# Patient Record
Sex: FEMALE | Race: WHITE | URBAN - METROPOLITAN AREA
[De-identification: names, ages, dates, MRNs, and addresses within clinical notes are randomized per-mention and may not be internally consistent; named-entity substitution may affect disease eponyms.]

---

## 2019-03-06 ENCOUNTER — TRANSFERRED RECORDS (OUTPATIENT)
Dept: HEALTH INFORMATION MANAGEMENT | Facility: CLINIC | Age: 24
End: 2019-03-06

## 2019-03-18 DIAGNOSIS — H35.50 RETINAL DYSTROPHY: Primary | ICD-10-CM

## 2019-03-19 ENCOUNTER — TELEPHONE (OUTPATIENT)
Dept: OPHTHALMOLOGY | Facility: CLINIC | Age: 24
End: 2019-03-19

## 2019-03-19 ENCOUNTER — OFFICE VISIT (OUTPATIENT)
Dept: OPHTHALMOLOGY | Facility: CLINIC | Age: 24
End: 2019-03-19
Attending: OPHTHALMOLOGY
Payer: COMMERCIAL

## 2019-03-19 DIAGNOSIS — H35.89 ATROPHIC RETINA: Primary | ICD-10-CM

## 2019-03-19 PROCEDURE — 92250 FUNDUS PHOTOGRAPHY W/I&R: CPT | Mod: ZF | Performed by: OPHTHALMOLOGY

## 2019-03-19 PROCEDURE — 92134 CPTRZ OPH DX IMG PST SGM RTA: CPT | Mod: ZF | Performed by: OPHTHALMOLOGY

## 2019-03-19 PROCEDURE — G0463 HOSPITAL OUTPT CLINIC VISIT: HCPCS | Mod: ZF

## 2019-03-19 RX ORDER — LEVONORGESTREL/ETHIN.ESTRADIOL 0.1-0.02MG
TABLET ORAL
COMMUNITY

## 2019-03-19 ASSESSMENT — REFRACTION_WEARINGRX
OS_CYLINDER: +1.75
OS_SPHERE: -6.25
SPECS_TYPE: SVL
OD_SPHERE: -3.50
OS_AXIS: 100
OD_CYLINDER: +1.75
OD_AXIS: 078

## 2019-03-19 ASSESSMENT — SLIT LAMP EXAM - LIDS
COMMENTS: NORMAL
COMMENTS: NORMAL

## 2019-03-19 ASSESSMENT — VISUAL ACUITY
OS_CC: 20/20
METHOD: SNELLEN - LINEAR
OD_CC: 20/20
CORRECTION_TYPE: GLASSES

## 2019-03-19 ASSESSMENT — CONF VISUAL FIELD
OS_NORMAL: 1
OD_NORMAL: 1

## 2019-03-19 ASSESSMENT — EXTERNAL EXAM - LEFT EYE: OS_EXAM: NORMAL

## 2019-03-19 ASSESSMENT — CUP TO DISC RATIO
OD_RATIO: 0.3
OS_RATIO: 0.3

## 2019-03-19 ASSESSMENT — TONOMETRY
IOP_METHOD: ICARE
OS_IOP_MMHG: 13
OD_IOP_MMHG: 13

## 2019-03-19 ASSESSMENT — EXTERNAL EXAM - RIGHT EYE: OD_EXAM: NORMAL

## 2019-03-19 NOTE — NURSING NOTE
Chief Complaints and History of Present Illnesses   Patient presents with     Retinal Evaluation     Chief Complaint(s) and History of Present Illness(es)     Retinal Evaluation     Laterality: both eyes    Onset: 1 year ago              Comments     Pt. States that she went in for routine exam and was told to see a retina specialist.  No change in VA BE.  Occasional flashes and floaters BE.  Mother has VMT.  Nicol Wilson COT 8:17 AM March 19, 2019

## 2019-03-19 NOTE — LETTER
3/19/2019       RE: Addie Perez  [address] [address]     Dear Colleague,    Thank you for referring your patient, Addie Perez, to the EYE CLINIC at [facility]. Please see a copy of my visit note below.    CC -  Peripheral retinal abnormalities    INTERVAL HISTORY - Initial visit with me.  No changes since saw [facility]    HPI -   Addie Perez is a  23 year old year-old patient referred by Dr Whiteside from [facility] for evaluation and treat of a possible retinal dystrophy OU.  Seen by Queta Damian for routine eye exam 2/2019 found peripheral changes, referred to [facility], sent to [facility] for eval  Has had annual eye exams, no prior mention.  Near-full-term birth, normal BW, no respiratory disease.  No h/o trauma  No h/o photopsias, no trouble in dim light, no trouble in bright light, no nyctalopia, mild difficulty with orange/yellow color discrimination but feels overall color vision normal  No family h/o retinal dystrophy      PAST OCULAR SURGERY  None    RETINAL IMAGING:  OCT 3-19-19  OD - macula retina normal, PHF attached  OS - macula retina normal, PHF attached    AF 3-19-19  OD - peripheral hypoAF with mild hyperAF border  OS - peripheral hypoAF with hyperAF border            ASSESSMENT & PLAN    1.  Peripheral retinal atrophy OU   - possible dystrophy   - DDx prior shallow RD (less likely d/t 360), developmental anomaly   - check GVF & ffERG      - will need to monitor for progression likely    2. Vitreous syneresis OU    return to clinic: for testing    ATTESTATION   Attending Attestation:  Complete documentation of historical and exam elements from today's encounter can be found in the full encounter summary report (not reduplicated in this progress note).  I personally obtained the chief complaint(s) and history of present illness.  I confirmed and edited as necessary the review of systems, past medical/surgical history, family history, social history, and  examination findings as documented by others; and I examined the patient myself.  I personally reviewed the relevant tests, images, and reports as documented above.  I formulated and edited as necessary the assessment and plan and discussed the findings and management plan with the patient and family. Светлана Gracia MD, PhD      Base Eye Exam     Visual Acuity (Snellen - Linear)       Right Left    Dist cc 20/20 20/20    Correction:  Glasses          Tonometry ([facility], 8:20 AM)       Right Left    Pressure 13 13          Pupils       Pupils React APD    Right PERRL Brisk None    Left PERRL Brisk None          Visual Fields       Left Right     Full Full          Extraocular Movement       Right Left     Full Full          Neuro/Psych     Oriented x3:  Yes    Mood/Affect:  Normal          Dilation     Both eyes:  1.0% Mydriacyl, 2.5% Dale Synephrine @ 8:22 AM            Slit Lamp and Fundus Exam     External Exam       Right Left    External Normal Normal          Slit Lamp Exam       Right Left    Lids/Lashes Normal Normal    Conjunctiva/Sclera White and quiet White and quiet    Cornea Clear Clear    Anterior Chamber Deep and quiet Deep and quiet    Iris Dilated Dilated    Lens clear phakic clear phakic    Vitreous AV clear, syneresis AV clear, syneresis          Fundus Exam       Right Left    Disc Normal Normal    C/D Ratio 0.3 0.3    Macula Normal Normal    Vessels Normal Normal    Periphery Far peripheral ring of atrophy 360 degrees with mild pigment clumping Far peripheral ring of atrophy 360 degrees with mild pigment clumping            Refraction     Wearing Rx       Sphere Cylinder Axis    Right -3.50 +1.75 078    Left -6.25 +1.75 100    Age:  2yr    Type:  SVL                Again, thank you for allowing me to participate in the care of your patient.      Sincerely,    Светлана Gracia MD, PhD  , Vitreoretinal Surgery  Department of Ophthalmology & Visual Neurosciences  [facility]  [place]     work

## 2019-03-19 NOTE — PROGRESS NOTES
CC -  Peripheral retinal abnormalities    INTERVAL HISTORY - Initial visit with me.  No changes since saw [facility]    HPI -   Addie Perez is a  23 year old year-old patient referred by Dr Whiteside from [facility] for evaluation and treat of a possible retinal dystrophy OU.  Seen by Queta Damian for routine eye exam 2/2019 found peripheral changes, referred to [facility], sent to [facility] for eval  Has had annual eye exams, no prior mention.  Near-full-term birth, normal BW, no respiratory disease.  No h/o trauma  No h/o photopsias, no trouble in dim light, no trouble in bright light, no nyctalopia, mild difficulty with orange/yellow color discrimination but feels overall color vision normal  No family h/o retinal dystrophy      PAST OCULAR SURGERY  None    RETINAL IMAGING:  OCT 3-19-19  OD - macula retina normal, PHF attached  OS - macula retina normal, PHF attached    AF 3-19-19  OD - peripheral hypoAF with mild hyperAF border  OS - peripheral hypoAF with hyperAF border            ASSESSMENT & PLAN    1.  Peripheral retinal atrophy OU   - possible dystrophy   - DDx prior shallow RD (less likely d/t 360), developmental anomaly   - check GVF & ffERG      - will need to monitor for progression likely      2. Vitreous syneresis OU      return to clinic: for testing    ATTESTATION     Attending Attestation:     Complete documentation of historical and exam elements from today's encounter can be found in the full encounter summary report (not reduplicated in this progress note).  I personally obtained the chief complaint(s) and history of present illness.  I confirmed and edited as necessary the review of systems, past medical/surgical history, family history, social history, and examination findings as documented by others; and I examined the patient myself.  I personally reviewed the relevant tests, images, and reports as documented above.  I formulated and edited as necessary the assessment and plan and discussed the  findings and management plan with the patient and family    Светлана Gracia MD, PhD  , Vitreoretinal Surgery  Department of Ophthalmology  [facility]

## 2019-03-22 ENCOUNTER — HEALTH MAINTENANCE LETTER (OUTPATIENT)
Age: 24
End: 2019-03-22

## 2019-04-08 ENCOUNTER — ALLIED HEALTH/NURSE VISIT (OUTPATIENT)
Dept: OPHTHALMOLOGY | Facility: CLINIC | Age: 24
End: 2019-04-08
Attending: OPHTHALMOLOGY
Payer: COMMERCIAL

## 2019-04-08 DIAGNOSIS — H35.89 ATROPHIC RETINA: ICD-10-CM

## 2019-04-08 PROCEDURE — 40000269 ZZH STATISTIC NO CHARGE FACILITY FEE: Mod: ZF

## 2019-04-08 PROCEDURE — 92274 MULTIFOCAL ERG W/I&R: CPT | Mod: ZF

## 2019-04-08 PROCEDURE — 92083 EXTENDED VISUAL FIELD XM: CPT | Mod: ZF

## 2019-04-08 ASSESSMENT — VISUAL ACUITY
CORRECTION_TYPE: GLASSES
OD_CC: 20/20
OS_CC: 20/20
METHOD: SNELLEN - LINEAR

## 2019-04-08 ASSESSMENT — REFRACTION_WEARINGRX
OS_SPHERE: -6.25
OD_AXIS: 078
OS_AXIS: 100
OS_CYLINDER: +1.75
OD_SPHERE: -3.50
OD_CYLINDER: +1.75
SPECS_TYPE: SVL

## 2019-04-08 NOTE — LETTER
2019       RE: Addie Perez  [address] [address]     Dear Colleague,    Thank you for referring your patient, Addie Perez, to the EYE CLINIC at [facility]. Please see a copy of my visit note below.    2019    STANDARD GVF + ERG REPORT    Referring:  Basil Whiteside    RE:  Addie Perez  MRN:  [id number]  :  1995    ERG Date:  2019    CLINICAL HISTORY:    The patient is a 23 year old woman with peripheral retinal abnormalities in both eyes of unknown etiology, featuring a bilateral peripheral ring of pigment disruption.  Testing was performed to evaluate for retinal dystrophy.    DIAGNOSTIC FINDINGS:    GVF   A GVF was performed in both eyes and was felt to be reliable by the technician.  The right eye results were normal, with a III4e horizontal diameter of 117 degrees.  The left eye showed shallow mid-peripheral scotomas but the III4e horizontal diameter was 120 degrees.    ffERG    A full field ERG was obtained in both eye using DTL electrodes and ISCEV standards.  Per the technician, the recording was essentially reliable in both eyes.      The results for the two eyes were similar. Under scotopic conditions, both eyes were essentially normal.  In particular, the dim white flash amplitudes were normal and the implicit times were normal.  The response to the bright white flash was essentially normal.  In particular, the A-wave amplitudes were super-normal and the implicit times was normal.  The B-wave amplitudes were super-normal and the implicit times were normal.   Oscillatory potentials were normal.     Under photopic conditions, the recordings were essentially normal in each eye.  In particular, the 30 Hz flicker amplitudes were normal and the implicit times were normal.    The single photopic flash morphology was normal in each right eye.  Numerically,  the A-wave amplitudes were normal and the implicit times had no abnormal delay.  The B-wave amplitudes were super-normal and the implicit times were normal.  The 30Hz flicker is more reliable for numerical measurements of the photopic response.                      Visual Acuity Right Eye : 20/20      w/gls, -3.50 + 1.75x078    Visual Acuity Left Eye : 20/20        w/gls, -6.25 + 1.75x100                    ALL AVERAGED  Data for Full-Field ERG Right Eye   Left Eye    Dark-Adapted Patient Normal Patient   0.01 ERG (kalia) amplitude( v)  380* 95.4-392.1 339*   0.01 ERG (kalia) implicit time(ms) 79* 71.7-100.1 84*   MMMMM      3.0 ERG (combined) a-wave amplitude( v) -328 -260.0 to -28.0 -286   3.0 ERG (combined) a-wave implicit time(ms) 15 12.2-21.1 15   3.0 ERG (combined) b-wave amplitude( v) 572 136.5-400.6 474   3.0 ERG (combined) b-wave implicit time(ms) 47.4 29.2-48.8 48.3   MMMMM      3.0 Oscillatory Potentials  + Present +    Light-Adapted      3.0 Flicker (30-Hz) amplitude( v) 152 55.9-187.0 112   3.0 Flicker (30-Hz) implicit time(ms) 25 20.6-28.6 25         3.0 ERG (cone) a-wave amplitude( v) -51 -62.2 to -10.6 -40   3.0 ERG (cone) a-wave implicit time(ms) 13.3 15.0-17.5 14.1   3.0 ERG (cone) b-wave amplitude( v) 228 69.9-205.4 159   3.0 ERG (cone) b-wave implicit time(ms) 27.5 24.6-31.0 27.5         * = manipulated cursors        parentheses = cursors at selected second peaks        ---- = residual to non-measurable        xxxx = not tested       INTERPRETATION:     1.  Normal GVF OD and mildly irregular GVF OS.  These irregularities are subtle and are of unknown significance.  The irregularities are not consistent with the ffERG response.  2. Likely normal ffERG OU. The results are actually super-normal, but this may be a result of the patient's young age being at the limits of the normative database.    3.  The results are not consistent with a retinal dystrophy; the suspicion for dystrophy is quite low now.  4.  The results are better than expected based on the fundus exam, suggesting  that the retinal areas with pigment disruption probably maintain significant function.      RECOMMENDATIONS  I would recommend monitoring the patient with annual photos.  If the atrophic areas expand then repeat GVF and ffERG testing could be performed.    Sincerely,    Светлана Gracia MD, PhD  , Vitreoretinal Surgery  Department of Ophthalmology  [facility]          Base Eye Exam     Visual Acuity (Snellen - Linear)       Right Left    Dist cc 20/20 20/20    Correction:  Glasses          Neuro/Psych     Oriented x3:  Yes    Mood/Affect:  Normal          Dilation     Both eyes:  1.0% Mydriacyl, 2.5% Dale Synephrine @ 11:30 AM   To dilate x2 after GVF done both eyes undilated.  Anxious about eye dx.  States prior vasovagal response to dilation.             Refraction     Wearing Rx       Sphere Cylinder Axis    Right -3.50 +1.75 078    Left -6.25 +1.75 100    Age:  2yr    Type:  SVL

## 2019-04-08 NOTE — PROGRESS NOTES
2019    STANDARD GVF + ERG REPORT    Referring:  Basil Whiteside    RE:  Addie Perez  MRN:  [id number]  :  1995    ERG Date:  2019    CLINICAL HISTORY:    The patient is a 23 year old woman with peripheral retinal abnormalities in both eyes of unknown etiology, featuring a bilateral peripheral ring of pigment disruption.  Testing was performed to evaluate for retinal dystrophy.    DIAGNOSTIC FINDINGS:    GVF   A GVF was performed in both eyes and was felt to be reliable by the technician.  The right eye results were normal, with a III4e horizontal diameter of 117 degrees.  The left eye showed shallow mid-peripheral scotomas but the III4e horizontal diameter was 120 degrees.    ffERG    A full field ERG was obtained in both eye using DTL electrodes and ISCEV standards.  Per the technician, the recording was essentially reliable in both eyes.      The results for the two eyes were similar. Under scotopic conditions, both eyes were essentially normal.  In particular, the dim white flash amplitudes were normal and the implicit times were normal.  The response to the bright white flash was essentially normal.  In particular, the A-wave amplitudes were super-normal and the implicit times was normal.  The B-wave amplitudes were super-normal and the implicit times were normal.   Oscillatory potentials were normal.     Under photopic conditions, the recordings were essentially normal in each eye.  In particular, the 30 Hz flicker amplitudes were normal and the implicit times were normal.    The single photopic flash morphology was normal in each right eye.  Numerically,  the A-wave amplitudes were normal and the implicit times had no abnormal delay. The B-wave amplitudes were super-normal and the implicit times were normal.  The 30Hz flicker is more reliable for numerical measurements of the photopic response.                      Visual Acuity Right Eye : 20/20      w/gls, -3.50 +  1.75x078    Visual Acuity Left Eye : 20/20        w/gls, -6.25 + 1.75x100                    ALL AVERAGED  Data for Full-Field ERG Right Eye   Left Eye    Dark-Adapted Patient Normal Patient   0.01 ERG (kalia) amplitude( v)  380* 95.4-392.1 339*   0.01 ERG (kalia) implicit time(ms) 79* 71.7-100.1 84*   MMMMM      3.0 ERG (combined) a-wave amplitude( v) -328 -260.0 to -28.0 -286   3.0 ERG (combined) a-wave implicit time(ms) 15 12.2-21.1 15   3.0 ERG (combined) b-wave amplitude( v) 572 136.5-400.6 474   3.0 ERG (combined) b-wave implicit time(ms) 47.4 29.2-48.8 48.3   MMMMM      3.0 Oscillatory Potentials  + Present +    Light-Adapted      3.0 Flicker (30-Hz) amplitude( v) 152 55.9-187.0 112   3.0 Flicker (30-Hz) implicit time(ms) 25 20.6-28.6 25         3.0 ERG (cone) a-wave amplitude( v) -51 -62.2 to -10.6 -40   3.0 ERG (cone) a-wave implicit time(ms) 13.3 15.0-17.5 14.1   3.0 ERG (cone) b-wave amplitude( v) 228 69.9-205.4 159   3.0 ERG (cone) b-wave implicit time(ms) 27.5 24.6-31.0 27.5         * = manipulated cursors        parentheses = cursors at selected second peaks        ---- = residual to non-measurable        xxxx = not tested       INTERPRETATION:     1.  Normal GVF OD and mildly irregular GVF OS.  These irregularities are subtle and are of unknown significance.  The irregularities are not consistent with the ffERG response.  2. Likely normal ffERG OU. The results are actually super-normal, but this may be a result of the patient's young age being at the limits of the normative database.    3.  The results are not consistent with a retinal dystrophy; the suspicion for dystrophy is quite low now.  4.  The results are better than expected based on the fundus exam, suggesting that the retinal areas with pigment disruption probably maintain significant function.      RECOMMENDATIONS  I would recommend monitoring the patient with annual photos.  If the atrophic areas expand then repeat GVF and ffERG testing could  be performed.    Sincerely,    Светлана Gracia MD, PhD  , Vitreoretinal Surgery  Department of Ophthalmology  [facility]

## 2019-04-08 NOTE — NURSING NOTE
Returns for GVF+ffERG.  LISA w/Dr. Gracia 03-30-19: +peripheral retinal atrophy, possible retinal dystrophy.  Retinal changes seen on recent routine eye exam  w/Queta Damian (optom) then referred to Dr. Mary Anne Whiteside/[facility].  No prior retinal findings noted per pt.  No visual symptoms or complaints, just wanted new rx/pg.  +Myopia-astigmatism LR.  No interval changes.  -Fhx retinal dystrophy.  No f/u until yearly f/u @[facility]; will await results and correspondence via [employer].  Accompanied by father today.

## 2020-03-11 ENCOUNTER — HEALTH MAINTENANCE LETTER (OUTPATIENT)
Age: 25
End: 2020-03-11

## 2020-09-24 ENCOUNTER — TRANSFERRED RECORDS (OUTPATIENT)
Dept: HEALTH INFORMATION MANAGEMENT | Facility: CLINIC | Age: 25
End: 2020-09-24

## 2020-09-25 ENCOUNTER — MEDICAL CORRESPONDENCE (OUTPATIENT)
Dept: HEALTH INFORMATION MANAGEMENT | Facility: CLINIC | Age: 25
End: 2020-09-25

## 2020-09-28 ENCOUNTER — TRANSCRIBE ORDERS (OUTPATIENT)
Dept: OTHER | Age: 25
End: 2020-09-28

## 2020-09-28 DIAGNOSIS — H47.393 OTHER DISORDERS OF OPTIC DISC, BILATERAL: Primary | ICD-10-CM

## 2020-10-13 ENCOUNTER — TRANSCRIBE ORDERS (OUTPATIENT)
Dept: OTHER | Age: 25
End: 2020-10-13

## 2020-10-13 DIAGNOSIS — H35.723 RETINAL PIGMENT EPITHELIAL DETACHMENT OF BOTH EYES: Primary | ICD-10-CM

## 2020-10-20 ENCOUNTER — OFFICE VISIT (OUTPATIENT)
Dept: OPHTHALMOLOGY | Facility: CLINIC | Age: 25
End: 2020-10-20
Attending: OPHTHALMOLOGY
Payer: COMMERCIAL

## 2020-10-20 DIAGNOSIS — H53.40 VISUAL FIELD DEFECT: ICD-10-CM

## 2020-10-20 DIAGNOSIS — H47.393 OTHER DISORDERS OF OPTIC DISC, BILATERAL: ICD-10-CM

## 2020-10-20 DIAGNOSIS — H53.40 VISUAL FIELD DEFECT: Primary | ICD-10-CM

## 2020-10-20 PROCEDURE — 92133 CPTRZD OPH DX IMG PST SGM ON: CPT | Performed by: OPHTHALMOLOGY

## 2020-10-20 PROCEDURE — 92083 EXTENDED VISUAL FIELD XM: CPT | Performed by: OPHTHALMOLOGY

## 2020-10-20 PROCEDURE — G0463 HOSPITAL OUTPT CLINIC VISIT: HCPCS | Performed by: TECHNICIAN/TECHNOLOGIST

## 2020-10-20 PROCEDURE — 99213 OFFICE O/P EST LOW 20 MIN: CPT | Mod: GC | Performed by: OPHTHALMOLOGY

## 2020-10-20 ASSESSMENT — CUP TO DISC RATIO
OD_RATIO: 0.3
OS_RATIO: 0.3

## 2020-10-20 ASSESSMENT — CONF VISUAL FIELD
METHOD: COUNTING FINGERS
OS_NORMAL: 1
OD_NORMAL: 1

## 2020-10-20 ASSESSMENT — REFRACTION_WEARINGRX
SPECS_TYPE: SVL
OS_AXIS: 110
OS_CYLINDER: +1.00
OD_AXIS: 084
OD_SPHERE: -3.50
OD_CYLINDER: +1.25
OS_SPHERE: -6.00

## 2020-10-20 ASSESSMENT — TONOMETRY
OD_IOP_MMHG: 18
OS_IOP_MMHG: 17
IOP_METHOD: ICARE

## 2020-10-20 ASSESSMENT — VISUAL ACUITY
METHOD: SNELLEN - LINEAR
OD_CC: 20/20
CORRECTION_TYPE: GLASSES
OS_CC: 20/20

## 2020-10-20 ASSESSMENT — EXTERNAL EXAM - RIGHT EYE: OD_EXAM: NORMAL

## 2020-10-20 ASSESSMENT — SLIT LAMP EXAM - LIDS
COMMENTS: NORMAL
COMMENTS: NORMAL

## 2020-10-20 ASSESSMENT — EXTERNAL EXAM - LEFT EYE: OS_EXAM: NORMAL

## 2020-10-20 NOTE — NURSING NOTE
Chief Complaint(s) and History of Present Illness(es)     New Patient     In both eyes (Other disorders of optic disc, bilateral, Referral Dr. Blair,  Eye Clinic).  Associated symptoms include Negative for headache, double vision and eye pain.              Comments     Patient states no double vision or blurry vision.    Denies eye pain or headaches. No pulsatile tinnitus.   +ear pain    TOM Chavez 10/20/2020 7:28 AM

## 2020-10-20 NOTE — LETTER
10/20/2020         RE:  :  MRN: Addie Perez  1995  [id number]     Dear Dr. Whiteside,    Thank you for asking me to see your very pleasant patient, Addie Perez, in neuro-ophthalmic consultation.  I would like to thank you for sending your records and I have summarized them in the history of present illness.   My assessment and plan are below.  For further details, please see my attached clinic note.      Assessment & Plan     Addie Perez is a 25 year old female with the following diagnoses:   1. Other disorders of optic disc, bilateral    2. Visual field defect         Patient was sent for consultation by Dr. Mary Anne Whiteside for congenital malformation of optic nerves vs tilted optic nerves    25 year old woman presented for funny looking optic nerves after her annual check up. She denies any blurred vision, double vision, headaches, pulsatile tinnitus, TVOs. She reports flashes of light in her periphery that she has been having for more than 2 years but has been stable. It is more prominent in day light and in the snow. Denies patching as a child.      POH: peripheral retinal atrophy each eye , prior shallow RD (by Dr Gracia 3/19/2019)    Her visual acuity is 20/20 in both eyes with no APD. IOP is 18 in the righ teye and 17 in the left eye. Color plates are full in both eyes. Anterior segment examination is unremarkable. Automated visual fields are normal in both eyes. OCT optic nerves showed normal RNFL thickness. She is more myopic in the left eye compared to the right    My impression is that she has pseudopapilledema.  There is tilting of the optic nerves bilaterally.  There is no evidence of an optic neuropathy.  I gave her reassurance that her optic nerves looked slightly anomalous but, to me, they appear acceptable.    Patient has peripheral retinal atrophy both eyes.  Electroretinogram (ERG) unremarkable.  Would defer further evaluation for this to the retina people.  She  denies night blindness, which is reassuring.    Patient also has visual snow.  This is an entity where patients see constant or nearly constant unformed hallucinations.  The most common description that patients give is television snow, however it has been described as red dots, pixelations, and black spots.  Given the normal visual field, no further workup is necessary.  I reassured the patient that this is a common phenomenon and is not vision threatening.  I also told the patient that learning to ignore it is the best strategy and that there is no treatment for it.   This was first described in an article published in Neurology. 1995 Apr;45(4):664-8. Persistent positive visual phenomena in migraine. Laith GT1, Tory NJ, Saira SL, Gretta NJ, Roland F, Paolo GS.            Again, thank you for allowing me to participate in the care of your patient.      Sincerely,    David Vela MD  Professor  Ophthalmology Residency   Director of Neuro-Ophthalmology  [employer] [facility] [employer] Endowed Chair  Departments of Ophthalmology, Neurology, and Neurosurgery  [facility] [address] [place]  [address]  T - [phone]  F  [phone]  SANDRA Short [email or web address]      CC: Mary Anne Whiteside MD  [facility]  [address]  [facility] [address]  Via Fax: [phone]     Светлана Gracia MD  [address]  [facility] [address]  Via In Basket     Queta Damian, OD  [facility]  [address]     [facility] [address]  Via Fax: [phone]    DX = visual snow

## 2020-10-20 NOTE — PROGRESS NOTES
Assessment & Plan     Addie Perez is a 25 year old female with the following diagnoses:   1. Other disorders of optic disc, bilateral    2. Visual field defect         Patient was sent for consultation by Dr. Mary Anne Whiteside for congenital malformation of optic nerves vs tilted optic nerves    25 year old woman presented for funny looking optic nerves after her annual check up. She denies any blurred vision, double vision, headaches, pulsatile tinnitus, TVOs. She reports flashes of light in her periphery that she has been having for more than 2 years but has been stable. It is more prominent in day light and in the snow. Denies patching as a child.      POH: peripheral retinal atrophy each eye , prior shallow RD (by Dr Gracia 3/19/2019)    Her visual acuity is 20/20 in both eyes with no APD. IOP is 18 in the righ teye and 17 in the left eye. Color plates are full in both eyes. Anterior segment examination is unremarkable. Automated visual fields are normal in both eyes. OCT optic nerves showed normal RNFL thickness. She is more myopic in the left eye compared to the right    My impression is that she has pseudopapilledema.  There is tilting of the optic nerves bilaterally.  There is no evidence of an optic neuropathy.  I gave her reassurance that her optic nerves looked slightly anomalous but, to me, they appear acceptable.    Patient has peripheral retinal atrophy both eyes.  Electroretinogram (ERG) unremarkable.  Would defer further evaluation for this to the retina people.  She denies night blindness, which is reassuring.    Patient also has visual snow.  This is an entity where patients see constant or nearly constant unformed hallucinations.  The most common description that patients give is television snow, however it has been described as red dots, pixelations, and black spots.  Given the normal visual field, no further workup is necessary.  I reassured the patient that this is a common  phenomenon and is not vision threatening.  I also told the patient that learning to ignore it is the best strategy and that there is no treatment for it.   This was first described in an article published in Neurology. 1995 Apr;45(4):664-8. Persistent positive visual phenomena in migraine. Laith GT1, Tory NJ, Saira SL, Gretta NJ, Roland F, Paolo GS.              Attending Physician Attestation:  Complete documentation of historical and exam elements from today's encounter can be found in the full encounter summary report (not reduplicated in this progress note).  I personally obtained the chief complaint(s) and history of present illness.  I confirmed and edited as necessary the review of systems, past medical/surgical history, family history, social history, and examination findings as documented by others; and I examined the patient myself.  I personally reviewed the relevant tests, images, and reports as documented above.  I formulated and edited as necessary the assessment and plan and discussed the findings and management plan with the patient and family. I personally reviewed the ophthalmic test(s) associated with this encounter, agree with the interpretation(s) as documented by the resident/fellow, and have edited the corresponding report(s) as necessary.  - David Ramon MD  Neuro-ophthalmology fellow  [facility]

## 2021-04-25 ENCOUNTER — HEALTH MAINTENANCE LETTER (OUTPATIENT)
Age: 26
End: 2021-04-25

## 2021-10-10 ENCOUNTER — HEALTH MAINTENANCE LETTER (OUTPATIENT)
Age: 26
End: 2021-10-10

## 2022-05-21 ENCOUNTER — HEALTH MAINTENANCE LETTER (OUTPATIENT)
Age: 27
End: 2022-05-21

## 2022-09-18 ENCOUNTER — HEALTH MAINTENANCE LETTER (OUTPATIENT)
Age: 27
End: 2022-09-18

## 2023-06-04 ENCOUNTER — HEALTH MAINTENANCE LETTER (OUTPATIENT)
Age: 28
End: 2023-06-04